# Patient Record
Sex: MALE | Race: WHITE | URBAN - METROPOLITAN AREA
[De-identification: names, ages, dates, MRNs, and addresses within clinical notes are randomized per-mention and may not be internally consistent; named-entity substitution may affect disease eponyms.]

---

## 2024-11-15 ENCOUNTER — OFFICE VISIT (OUTPATIENT)
Dept: INTERNAL MEDICINE CLINIC | Facility: CLINIC | Age: 54
End: 2024-11-15
Payer: COMMERCIAL

## 2024-11-15 VITALS
RESPIRATION RATE: 16 BRPM | SYSTOLIC BLOOD PRESSURE: 140 MMHG | DIASTOLIC BLOOD PRESSURE: 78 MMHG | WEIGHT: 192.69 LBS | BODY MASS INDEX: 25.54 KG/M2 | HEART RATE: 80 BPM | TEMPERATURE: 97 F | HEIGHT: 72.64 IN

## 2024-11-15 DIAGNOSIS — Z13.220 SCREENING FOR LIPID DISORDERS: ICD-10-CM

## 2024-11-15 DIAGNOSIS — Z13.228 SCREENING FOR ENDOCRINE, METABOLIC AND IMMUNITY DISORDER: ICD-10-CM

## 2024-11-15 DIAGNOSIS — Z13.89 SCREENING FOR GENITOURINARY CONDITION: ICD-10-CM

## 2024-11-15 DIAGNOSIS — F17.200 CURRENT SMOKER: ICD-10-CM

## 2024-11-15 DIAGNOSIS — Z13.0 SCREENING FOR ENDOCRINE, METABOLIC AND IMMUNITY DISORDER: ICD-10-CM

## 2024-11-15 DIAGNOSIS — Z71.6 ENCOUNTER FOR TOBACCO USE CESSATION COUNSELING: ICD-10-CM

## 2024-11-15 DIAGNOSIS — Z13.0 SCREENING FOR DEFICIENCY ANEMIA: ICD-10-CM

## 2024-11-15 DIAGNOSIS — Z13.1 SCREENING FOR DIABETES MELLITUS: ICD-10-CM

## 2024-11-15 DIAGNOSIS — Z12.5 SCREENING FOR PROSTATE CANCER: ICD-10-CM

## 2024-11-15 DIAGNOSIS — R10.13 EPIGASTRIC PAIN: ICD-10-CM

## 2024-11-15 DIAGNOSIS — Z00.00 PHYSICAL EXAM, ANNUAL: Primary | ICD-10-CM

## 2024-11-15 DIAGNOSIS — Z12.2 SCREENING FOR LUNG CANCER: ICD-10-CM

## 2024-11-15 DIAGNOSIS — E88.2 LIPOMATOSIS: ICD-10-CM

## 2024-11-15 DIAGNOSIS — I10 PRIMARY HYPERTENSION: ICD-10-CM

## 2024-11-15 DIAGNOSIS — Z13.29 SCREENING FOR THYROID DISORDER: ICD-10-CM

## 2024-11-15 DIAGNOSIS — Z23 NEED FOR VACCINATION: ICD-10-CM

## 2024-11-15 DIAGNOSIS — E55.9 VITAMIN D DEFICIENCY: ICD-10-CM

## 2024-11-15 DIAGNOSIS — Z11.4 SCREENING FOR HIV (HUMAN IMMUNODEFICIENCY VIRUS): ICD-10-CM

## 2024-11-15 DIAGNOSIS — Z13.29 SCREENING FOR ENDOCRINE, METABOLIC AND IMMUNITY DISORDER: ICD-10-CM

## 2024-11-15 DIAGNOSIS — Z11.59 NEED FOR HEPATITIS C SCREENING TEST: ICD-10-CM

## 2024-11-15 DIAGNOSIS — Z11.59 NEED FOR HEPATITIS B SCREENING TEST: ICD-10-CM

## 2024-11-15 PROCEDURE — 83013 H PYLORI (C-13) BREATH: CPT | Performed by: STUDENT IN AN ORGANIZED HEALTH CARE EDUCATION/TRAINING PROGRAM

## 2024-11-15 RX ORDER — LOSARTAN POTASSIUM 100 MG/1
100 TABLET ORAL DAILY
Qty: 90 TABLET | Refills: 0 | Status: SHIPPED
Start: 2024-11-15

## 2024-11-15 RX ORDER — LOSARTAN POTASSIUM 100 MG/1
100 TABLET ORAL DAILY
Qty: 90 TABLET | Refills: 0 | Status: SHIPPED | OUTPATIENT
Start: 2024-11-15

## 2024-11-15 RX ORDER — LOSARTAN POTASSIUM 100 MG/1
100 TABLET ORAL DAILY
COMMUNITY
Start: 2024-06-26 | End: 2024-11-15

## 2024-11-15 NOTE — PROGRESS NOTES
[facility]    CHIEF COMPLAINT:   Chief Complaint   Patient presents with    Blood Pressure          HPI:   Marco Chaparro is a 54 year old male with PMH of HTN, lipomatosis who presents for a complete physical exam and to address his hypertension.      Patient is originally from [facility], came to the [place] in 2022.   He is  for the past 30 years, has 2 daughters (30 yo and 24 yo)    His wife recently was tested positive for H.Pylori and patient would like to get tested as well as he has some periodic abdominal discomfort.    # HTN   Was diagnosed back in [facility], was seeing Ambar Baig APRN   Was taking Losartan 100 mg daily, however ran out of it in the past few months and his BP was running high.  Today in the office is 140/78     Occupation: Works as  for trucks.    Exercise:  Work is physical  Eye exam: last time checked his vision in the beginning of 2024  Dentist: Goes to dentist  as needed  Driving: yes, with the seatbelt  Smoking: Smokes since he was 19 yo, about 1 pack a day, never tried to quit. He is considering to quit now.  Alcohol: occaisionally   No other substance use.     Vaccinations:  Influenza: Wants to do it another time  Pneumococcal: Will need due to smoking history, will give in the office today  Shingles: Due, He will think about it  Tdap/Td: Due, will give it in the office today    Screenings:  Colonoscopy: Cologuard  06/18/2024 was negative, due in 3 years  PSA: Due, will order  Lung cancer screen? (age 50-80 w/ 20 pack year smoking history and currently smoking or quit in last 15 yrs): Due  AAA ultrasound screen? (age 65-75 with any smoking history):  Will need at the age of 66    Wt Readings from Last 6 Encounters:   11/15/24 192 lb 11.2 oz (87.4 kg)     Body mass index is 25.68 kg/m².       Current Outpatient Medications   Medication Sig Dispense Refill    losartan 100 MG Oral Tab Take 1 tablet (100 mg total) by mouth daily. 90 tablet 0    losartan  100 MG Oral Tab Take 1 tablet (100 mg total) by mouth daily. 90 tablet 0      History reviewed. No pertinent past medical history.   History reviewed. No pertinent surgical history.   History reviewed. No pertinent family history.   Social History:   Social History     Socioeconomic History    Marital status:    Tobacco Use    Smoking status: Every Day     Current packs/day: 0.80     Average packs/day: 0.8 packs/day for 36.9 years (29.5 ttl pk-yrs)     Types: Cigarettes     Start date: 1988    Smokeless tobacco: Never   Vaping Use    Vaping status: Never Used   Substance and Sexual Activity    Alcohol use: Yes     Comment: seldom    Drug use: Never          REVIEW OF SYSTEMS:   Review of Systems   Constitutional: Negative.    HENT: Negative.     Eyes: Negative.    Respiratory: Negative.     Cardiovascular: Negative.    Gastrointestinal: Negative.    Genitourinary: Negative.    Musculoskeletal: Negative.    Skin: Negative.    Neurological: Negative.       EXAM:   /78   Pulse 80   Temp 97.3 °F (36.3 °C) (Temporal)   Resp 16   Ht 6' 0.64\" (1.845 m)   Wt 192 lb 11.2 oz (87.4 kg)   BMI 25.68 kg/m²   Body mass index is 25.68 kg/m².   GENERAL: well developed, well nourished,in no apparent distress  SKIN: Multiple different size lipomas on b/l upper extremities, on the back, and b/l LE.  HEENT: atraumatic, normocephalic,ears and throat are clear  EYES:PERRLA, conjunctiva are clear  NECK: supple,no adenopathy  CHEST: no chest tenderness  LUNGS: clear to auscultation  CARDIO: nl s1 and s2, RRR without murmur  GI: good BS's,no masses, HSM or tenderness  MUSCULOSKELETAL: back is not tender,FROM of the back  EXTREMITIES: no cyanosis, clubbing or edema  NEURO: Oriented times three,cranial nerves are intact,motor and sensory are grossly intact  Labs:   No results found for: \"WBC\", \"HGB\", \"PLT\"   No results found for: \"GLU\", \"NA\", \"K\", \"CL\", \"CO2\", \"CREATSERUM\", \"CA\", \"ALB\", \"TP\", \"ALKPHO\", \"AST\", \"ALT\", \"BILT\",  \"TSH\", \"T4F\"     No results found for: \"CHOLEST\", \"HDL\", \"TRIG\", \"LDL\", \"NONHDLC\"    No results found for: \"A1C\"   Vitamin D:    No results found for: \"VITD\"        ASSESSMENT AND PLAN:   Marco Chaparro is a 54 year old male who presents for a complete physical exam.     1. Physical exam, annual  Pt' s weight is Body mass index is 25.68 kg/m².. Recommended regular exercise.   Age appropriate screenings discussed and orders placed.  The patient indicates understanding of these issues and agrees to the plan.  Annual eye exam and Q 6 month dental exam recommended    2. Primary hypertension  Patient Bps from home ar elevated in 140s/90s. Will refill his Losartan 100 mg daily for 90 days.    3. Lipomatosis  Not bothering patient. Has had it for a very long time.     4. Epigastric pain  Has exposure to H. Pylori. His wife recently tested positive and was treated. Will check H.Pylori.  - H PYLORI BREATH TEST [15571][Q]; Future  - H PYLORI BREATH TEST [74965][Q]    5. Screening for deficiency anemia  - CBC With Differential With Platelet; Future    6. Current smoker  Needs Lung cancer screening  Needs Prevnar 20, will administer in the office today.  - CT LUNG LD SCREENING(CPT=71271); Future  - Prevnar 20 (PCV20) [00243]  - Smoking Cessation less than 3 minutes    7. Screening for endocrine, metabolic and immunity disorder  - Comp Metabolic Panel (14); Future    8. Screening for lipid disorders  - Lipid Panel; Future    9. Screening for diabetes mellitus  - Hemoglobin A1C; Future    10. Screening for thyroid disorder  - TSH W Reflex To Free T4; Future    11. Vitamin D deficiency  - Vitamin D; Future    12. Screening for prostate cancer  - PSA Total, Screen; Future    13. Need for hepatitis B screening test  - Hepatitis B Surface Antigen; Future  - Hepatitis B Surface Antibody; Future    16. Screening for HIV (human immunodeficiency virus)  - HIV Ag/Ab Combo; Future    16. Need for hepatitis C screening test  - HCV  Antibody; Future    17. Need for vaccination  - TETANUS, DIPHTHERIA TOXOIDS AND ACELLULAR PERTUSIS VACCINE (TDAP), >7 YEARS, IM USE  - Prevnar 20 (PCV20) [46239]    18. Screening for genitourinary condition  - Urinalysis, Routine [E]; Future    19. Screening for lung cancer  - CT LUNG LD SCREENING(CPT=71271); Future    20. Encounter for tobacco use cessation counseling  Patient is considering, but not ready to quit yet.  Educated in regards to the risk of smoking.  - Smoking Cessation less than 3 minutes       Return in about 3 months (around 2/15/2025) for HTN follow up.      Wendi Collier MD

## 2024-11-16 LAB — H PYLORI BREATH TEST: POSITIVE

## 2024-11-19 ENCOUNTER — LAB ENCOUNTER (OUTPATIENT)
Dept: LAB | Age: 54
End: 2024-11-19
Attending: STUDENT IN AN ORGANIZED HEALTH CARE EDUCATION/TRAINING PROGRAM
Payer: COMMERCIAL

## 2024-11-19 DIAGNOSIS — Z11.59 NEED FOR HEPATITIS C SCREENING TEST: ICD-10-CM

## 2024-11-19 DIAGNOSIS — Z13.29 SCREENING FOR ENDOCRINE, METABOLIC AND IMMUNITY DISORDER: ICD-10-CM

## 2024-11-19 DIAGNOSIS — Z13.1 SCREENING FOR DIABETES MELLITUS: ICD-10-CM

## 2024-11-19 DIAGNOSIS — Z12.5 SCREENING FOR PROSTATE CANCER: ICD-10-CM

## 2024-11-19 DIAGNOSIS — Z13.220 SCREENING FOR LIPID DISORDERS: ICD-10-CM

## 2024-11-19 DIAGNOSIS — R31.21 ASYMPTOMATIC MICROSCOPIC HEMATURIA: ICD-10-CM

## 2024-11-19 DIAGNOSIS — Z13.89 SCREENING FOR GENITOURINARY CONDITION: ICD-10-CM

## 2024-11-19 DIAGNOSIS — Z13.29 SCREENING FOR THYROID DISORDER: ICD-10-CM

## 2024-11-19 DIAGNOSIS — Z11.4 SCREENING FOR HIV (HUMAN IMMUNODEFICIENCY VIRUS): ICD-10-CM

## 2024-11-19 DIAGNOSIS — Z13.228 SCREENING FOR ENDOCRINE, METABOLIC AND IMMUNITY DISORDER: ICD-10-CM

## 2024-11-19 DIAGNOSIS — A04.8 HELICOBACTER PYLORI INFECTION: Primary | ICD-10-CM

## 2024-11-19 DIAGNOSIS — E55.9 VITAMIN D DEFICIENCY: ICD-10-CM

## 2024-11-19 DIAGNOSIS — Z13.0 SCREENING FOR DEFICIENCY ANEMIA: ICD-10-CM

## 2024-11-19 DIAGNOSIS — Z11.59 NEED FOR HEPATITIS B SCREENING TEST: ICD-10-CM

## 2024-11-19 DIAGNOSIS — Z13.0 SCREENING FOR ENDOCRINE, METABOLIC AND IMMUNITY DISORDER: ICD-10-CM

## 2024-11-19 LAB
ALBUMIN SERPL-MCNC: 4.5 G/DL (ref 3.2–4.8)
ALBUMIN/GLOB SERPL: 1.6 {RATIO} (ref 1–2)
ALP LIVER SERPL-CCNC: 79 U/L
ALT SERPL-CCNC: 11 U/L
ANION GAP SERPL CALC-SCNC: 5 MMOL/L (ref 0–18)
AST SERPL-CCNC: 23 U/L (ref ?–34)
BASOPHILS # BLD AUTO: 0.09 X10(3) UL (ref 0–0.2)
BASOPHILS NFR BLD AUTO: 0.7 %
BILIRUB SERPL-MCNC: 0.4 MG/DL (ref 0.3–1.2)
BILIRUB UR QL STRIP.AUTO: NEGATIVE
BUN BLD-MCNC: 20 MG/DL (ref 9–23)
CALCIUM BLD-MCNC: 9.6 MG/DL (ref 8.7–10.4)
CHLORIDE SERPL-SCNC: 108 MMOL/L (ref 98–112)
CHOLEST SERPL-MCNC: 172 MG/DL (ref ?–200)
CLARITY UR REFRACT.AUTO: CLEAR
CO2 SERPL-SCNC: 29 MMOL/L (ref 21–32)
COLOR UR AUTO: YELLOW
COMPLEXED PSA SERPL-MCNC: 1.27 NG/ML (ref ?–4)
CREAT BLD-MCNC: 0.92 MG/DL
EGFRCR SERPLBLD CKD-EPI 2021: 99 ML/MIN/1.73M2 (ref 60–?)
EOSINOPHIL # BLD AUTO: 0.45 X10(3) UL (ref 0–0.7)
EOSINOPHIL NFR BLD AUTO: 3.6 %
ERYTHROCYTE [DISTWIDTH] IN BLOOD BY AUTOMATED COUNT: 11.5 %
EST. AVERAGE GLUCOSE BLD GHB EST-MCNC: 100 MG/DL (ref 68–126)
FASTING PATIENT LIPID ANSWER: YES
FASTING STATUS PATIENT QL REPORTED: YES
GLOBULIN PLAS-MCNC: 2.8 G/DL (ref 2–3.5)
GLUCOSE BLD-MCNC: 103 MG/DL (ref 70–99)
GLUCOSE UR STRIP.AUTO-MCNC: NORMAL MG/DL
HBA1C MFR BLD: 5.1 % (ref ?–5.7)
HBV SURFACE AB SER QL: REACTIVE
HBV SURFACE AB SERPL IA-ACNC: 16.44 MIU/ML
HBV SURFACE AG SER-ACNC: <0.1 [IU]/L
HBV SURFACE AG SERPL QL IA: NONREACTIVE
HCT VFR BLD AUTO: 44.1 %
HCV AB SERPL QL IA: NONREACTIVE
HDLC SERPL-MCNC: 40 MG/DL (ref 40–59)
HGB BLD-MCNC: 14.2 G/DL
IMM GRANULOCYTES # BLD AUTO: 0.04 X10(3) UL (ref 0–1)
IMM GRANULOCYTES NFR BLD: 0.3 %
KETONES UR STRIP.AUTO-MCNC: NEGATIVE MG/DL
LDLC SERPL CALC-MCNC: 113 MG/DL (ref ?–100)
LEUKOCYTE ESTERASE UR QL STRIP.AUTO: NEGATIVE
LYMPHOCYTES # BLD AUTO: 1.77 X10(3) UL (ref 1–4)
LYMPHOCYTES NFR BLD AUTO: 14 %
MCH RBC QN AUTO: 30 PG (ref 26–34)
MCHC RBC AUTO-ENTMCNC: 32.2 G/DL (ref 31–37)
MCV RBC AUTO: 93 FL
MONOCYTES # BLD AUTO: 0.93 X10(3) UL (ref 0.1–1)
MONOCYTES NFR BLD AUTO: 7.4 %
NEUTROPHILS # BLD AUTO: 9.32 X10 (3) UL (ref 1.5–7.7)
NEUTROPHILS # BLD AUTO: 9.32 X10(3) UL (ref 1.5–7.7)
NEUTROPHILS NFR BLD AUTO: 74 %
NITRITE UR QL STRIP.AUTO: NEGATIVE
NONHDLC SERPL-MCNC: 132 MG/DL (ref ?–130)
OSMOLALITY SERPL CALC.SUM OF ELEC: 297 MOSM/KG (ref 275–295)
PH UR STRIP.AUTO: 5.5 [PH] (ref 5–8)
PLATELET # BLD AUTO: 266 10(3)UL (ref 150–450)
POTASSIUM SERPL-SCNC: 4.3 MMOL/L (ref 3.5–5.1)
PROT SERPL-MCNC: 7.3 G/DL (ref 5.7–8.2)
PROT UR STRIP.AUTO-MCNC: NEGATIVE MG/DL
RBC # BLD AUTO: 4.74 X10(6)UL
SODIUM SERPL-SCNC: 142 MMOL/L (ref 136–145)
SP GR UR STRIP.AUTO: 1.03 (ref 1–1.03)
TRIGL SERPL-MCNC: 105 MG/DL (ref 30–149)
TSI SER-ACNC: 2.12 UIU/ML (ref 0.55–4.78)
UROBILINOGEN UR STRIP.AUTO-MCNC: NORMAL MG/DL
VIT D+METAB SERPL-MCNC: 36.3 NG/ML (ref 30–100)
VLDLC SERPL CALC-MCNC: 18 MG/DL (ref 0–30)
WBC # BLD AUTO: 12.6 X10(3) UL (ref 4–11)

## 2024-11-19 PROCEDURE — 83036 HEMOGLOBIN GLYCOSYLATED A1C: CPT

## 2024-11-19 PROCEDURE — 36415 COLL VENOUS BLD VENIPUNCTURE: CPT

## 2024-11-19 PROCEDURE — 80053 COMPREHEN METABOLIC PANEL: CPT

## 2024-11-19 PROCEDURE — 80061 LIPID PANEL: CPT

## 2024-11-19 PROCEDURE — 84443 ASSAY THYROID STIM HORMONE: CPT

## 2024-11-19 PROCEDURE — 82306 VITAMIN D 25 HYDROXY: CPT

## 2024-11-19 PROCEDURE — 86706 HEP B SURFACE ANTIBODY: CPT

## 2024-11-19 PROCEDURE — 85025 COMPLETE CBC W/AUTO DIFF WBC: CPT

## 2024-11-19 PROCEDURE — 87389 HIV-1 AG W/HIV-1&-2 AB AG IA: CPT

## 2024-11-19 PROCEDURE — 86803 HEPATITIS C AB TEST: CPT

## 2024-11-19 PROCEDURE — 81001 URINALYSIS AUTO W/SCOPE: CPT

## 2024-11-19 PROCEDURE — 87340 HEPATITIS B SURFACE AG IA: CPT

## 2024-11-19 RX ORDER — METRONIDAZOLE 500 MG/1
TABLET ORAL
Qty: 56 TABLET | Refills: 0 | Status: SHIPPED | OUTPATIENT
Start: 2024-11-19

## 2024-11-19 RX ORDER — TETRACYCLINE HYDROCHLORIDE 500 MG/1
500 CAPSULE ORAL 4 TIMES DAILY
Qty: 56 CAPSULE | Refills: 0 | Status: SHIPPED | OUTPATIENT
Start: 2024-11-19

## 2024-11-19 RX ORDER — BISMUTH SUBSALICYLATE 262 MG
TABLET,CHEWABLE ORAL
Qty: 112 TABLET | Refills: 0 | Status: SHIPPED | OUTPATIENT
Start: 2024-11-19

## 2025-02-17 RX ORDER — LOSARTAN POTASSIUM 100 MG/1
100 TABLET ORAL DAILY
Qty: 90 TABLET | Refills: 0 | Status: SHIPPED | OUTPATIENT
Start: 2025-02-17

## 2025-02-17 NOTE — TELEPHONE ENCOUNTER
Last OV relevant to medication: 11/15/24  Last refill date: 11/15/24 #90/refills: 0  When pt was asked to return for OV: 2/15/25  Upcoming appt/reason: No future appointments.  Was pt informed of any over due labs: utd  Lab Results   Component Value Date     (H) 11/19/2024    BUN 20 11/19/2024    CREATSERUM 0.92 11/19/2024    ANIONGAP 5 11/19/2024    CA 9.6 11/19/2024    OSMOCALC 297 (H) 11/19/2024    ALKPHO 79 11/19/2024    AST 23 11/19/2024    ALT 11 11/19/2024    BILT 0.4 11/19/2024    TP 7.3 11/19/2024    ALB 4.5 11/19/2024    GLOBULIN 2.8 11/19/2024     11/19/2024    K 4.3 11/19/2024     11/19/2024    CO2 29.0 11/19/2024     Please call to schedule appointment due for hypertension follow up, thanks!

## 2025-06-17 ENCOUNTER — TELEPHONE (OUTPATIENT)
Dept: GASTROENTEROLOGY | Age: 55
End: 2025-06-17

## 2025-06-24 ENCOUNTER — APPOINTMENT (OUTPATIENT)
Dept: GASTROENTEROLOGY | Age: 55
End: 2025-06-24

## 2025-06-24 VITALS — WEIGHT: 190 LBS | BODY MASS INDEX: 25.73 KG/M2 | HEIGHT: 72 IN

## 2025-06-24 DIAGNOSIS — K92.1 HEMATOCHEZIA: Primary | ICD-10-CM

## 2025-06-24 PROCEDURE — 99203 OFFICE O/P NEW LOW 30 MIN: CPT | Performed by: INTERNAL MEDICINE

## 2025-06-24 RX ORDER — HYDROCHLOROTHIAZIDE 25 MG/1
25 TABLET ORAL DAILY
COMMUNITY
Start: 2025-06-14

## 2025-06-24 RX ORDER — POLYETHYLENE GLYCOL 3350, SODIUM SULFATE, POTASSIUM CHLORIDE, MAGNESIUM SULFATE, AND SODIUM CHLORIDE FOR ORAL SOLUTION 178.7-7.3G
1 KIT ORAL SEE ADMIN INSTRUCTIONS
Qty: 1 EACH | Refills: 0 | Status: SHIPPED | OUTPATIENT
Start: 2025-06-24

## 2025-06-24 RX ORDER — LOSARTAN POTASSIUM 100 MG/1
100 TABLET ORAL DAILY
COMMUNITY
Start: 2025-02-17

## 2025-06-24 RX ORDER — HYDROCHLOROTHIAZIDE 12.5 MG/1
25 TABLET ORAL DAILY
COMMUNITY
Start: 2025-05-05

## 2025-07-10 ENCOUNTER — ANESTHESIA EVENT (OUTPATIENT)
Dept: GASTROENTEROLOGY | Age: 55
End: 2025-07-10

## 2025-07-11 ENCOUNTER — ANESTHESIA (OUTPATIENT)
Dept: GASTROENTEROLOGY | Age: 55
End: 2025-07-11

## 2025-07-11 ENCOUNTER — APPOINTMENT (OUTPATIENT)
Dept: GASTROENTEROLOGY | Age: 55
End: 2025-07-11
Attending: INTERNAL MEDICINE

## 2025-07-11 VITALS
DIASTOLIC BLOOD PRESSURE: 87 MMHG | RESPIRATION RATE: 18 BRPM | OXYGEN SATURATION: 98 % | BODY MASS INDEX: 24.84 KG/M2 | TEMPERATURE: 97 F | SYSTOLIC BLOOD PRESSURE: 114 MMHG | HEART RATE: 72 BPM | WEIGHT: 187.39 LBS | HEIGHT: 73 IN

## 2025-07-11 DIAGNOSIS — K92.1 HEMATOCHEZIA: ICD-10-CM

## 2025-07-11 DIAGNOSIS — K63.5 POLYP OF COLON, UNSPECIFIED PART OF COLON, UNSPECIFIED TYPE: ICD-10-CM

## 2025-07-11 DIAGNOSIS — K64.9 HEMORRHOIDS, UNSPECIFIED HEMORRHOID TYPE: ICD-10-CM

## 2025-07-11 RX ORDER — DEXTROSE MONOHYDRATE 25 G/50ML
25 INJECTION, SOLUTION INTRAVENOUS PRN
Status: DISCONTINUED | OUTPATIENT
Start: 2025-07-11 | End: 2025-07-13 | Stop reason: HOSPADM

## 2025-07-11 RX ORDER — SODIUM CHLORIDE 9 MG/ML
INJECTION, SOLUTION INTRAVENOUS CONTINUOUS
Status: DISCONTINUED | OUTPATIENT
Start: 2025-07-11 | End: 2025-07-13 | Stop reason: HOSPADM

## 2025-07-11 RX ORDER — DEXTROSE MONOHYDRATE 50 MG/ML
INJECTION, SOLUTION INTRAVENOUS CONTINUOUS PRN
Status: DISCONTINUED | OUTPATIENT
Start: 2025-07-11 | End: 2025-07-13 | Stop reason: HOSPADM

## 2025-07-11 RX ORDER — SODIUM CHLORIDE, SODIUM LACTATE, POTASSIUM CHLORIDE, CALCIUM CHLORIDE 600; 310; 30; 20 MG/100ML; MG/100ML; MG/100ML; MG/100ML
INJECTION, SOLUTION INTRAVENOUS CONTINUOUS
Status: DISCONTINUED | OUTPATIENT
Start: 2025-07-11 | End: 2025-07-13 | Stop reason: HOSPADM

## 2025-07-11 RX ORDER — LIDOCAINE HYDROCHLORIDE 10 MG/ML
5 INJECTION, SOLUTION INFILTRATION; PERINEURAL PRN
Status: DISCONTINUED | OUTPATIENT
Start: 2025-07-11 | End: 2025-07-13 | Stop reason: HOSPADM

## 2025-07-11 RX ORDER — 0.9 % SODIUM CHLORIDE 0.9 %
2 VIAL (ML) INJECTION EVERY 12 HOURS SCHEDULED
Status: DISCONTINUED | OUTPATIENT
Start: 2025-07-11 | End: 2025-07-13 | Stop reason: HOSPADM

## 2025-07-11 RX ORDER — 0.9 % SODIUM CHLORIDE 0.9 %
10 VIAL (ML) INJECTION PRN
Status: DISCONTINUED | OUTPATIENT
Start: 2025-07-11 | End: 2025-07-13 | Stop reason: HOSPADM

## 2025-07-11 RX ORDER — LIDOCAINE HYDROCHLORIDE 10 MG/ML
INJECTION, SOLUTION INFILTRATION; PERINEURAL PRN
Status: DISCONTINUED | OUTPATIENT
Start: 2025-07-11 | End: 2025-07-11

## 2025-07-11 RX ORDER — NICOTINE POLACRILEX 4 MG
30 LOZENGE BUCCAL
Status: DISCONTINUED | OUTPATIENT
Start: 2025-07-11 | End: 2025-07-13 | Stop reason: HOSPADM

## 2025-07-11 RX ORDER — PROPOFOL 10 MG/ML
INJECTION, EMULSION INTRAVENOUS PRN
Status: DISCONTINUED | OUTPATIENT
Start: 2025-07-11 | End: 2025-07-11

## 2025-07-11 RX ADMIN — PROPOFOL 20 MG: 10 INJECTION, EMULSION INTRAVENOUS at 12:00

## 2025-07-11 RX ADMIN — PROPOFOL 30 MG: 10 INJECTION, EMULSION INTRAVENOUS at 11:58

## 2025-07-11 RX ADMIN — PROPOFOL 20 MG: 10 INJECTION, EMULSION INTRAVENOUS at 12:12

## 2025-07-11 RX ADMIN — PROPOFOL 20 MG: 10 INJECTION, EMULSION INTRAVENOUS at 12:02

## 2025-07-11 RX ADMIN — PROPOFOL 20 MG: 10 INJECTION, EMULSION INTRAVENOUS at 12:14

## 2025-07-11 RX ADMIN — PROPOFOL 20 MG: 10 INJECTION, EMULSION INTRAVENOUS at 12:06

## 2025-07-11 RX ADMIN — PROPOFOL 20 MG: 10 INJECTION, EMULSION INTRAVENOUS at 12:10

## 2025-07-11 RX ADMIN — LIDOCAINE HYDROCHLORIDE 50 MG: 10 INJECTION, SOLUTION INFILTRATION; PERINEURAL at 11:53

## 2025-07-11 RX ADMIN — SODIUM CHLORIDE, SODIUM LACTATE, POTASSIUM CHLORIDE, CALCIUM CHLORIDE: 600; 310; 30; 20 INJECTION, SOLUTION INTRAVENOUS at 10:42

## 2025-07-11 RX ADMIN — PROPOFOL 50 MG: 10 INJECTION, EMULSION INTRAVENOUS at 11:55

## 2025-07-11 RX ADMIN — PROPOFOL 20 MG: 10 INJECTION, EMULSION INTRAVENOUS at 12:08

## 2025-07-11 RX ADMIN — PROPOFOL 20 MG: 10 INJECTION, EMULSION INTRAVENOUS at 12:04

## 2025-07-11 RX ADMIN — PROPOFOL 100 MG: 10 INJECTION, EMULSION INTRAVENOUS at 11:53

## 2025-07-11 ASSESSMENT — PAIN SCALES - GENERAL: PAINLEVEL_OUTOF10: 0

## 2025-07-11 ASSESSMENT — ACTIVITIES OF DAILY LIVING (ADL)
ADL_BEFORE_ADMISSION: INDEPENDENT
ADL_SCORE: 12

## 2025-07-15 LAB
ASR DISCLAIMER: NORMAL
CASE RPRT: NORMAL
CLINICAL INFO: NORMAL
PATH REPORT.FINAL DX SPEC: NORMAL
PATH REPORT.GROSS SPEC: NORMAL